# Patient Record
Sex: FEMALE | Race: WHITE | NOT HISPANIC OR LATINO | URBAN - METROPOLITAN AREA
[De-identification: names, ages, dates, MRNs, and addresses within clinical notes are randomized per-mention and may not be internally consistent; named-entity substitution may affect disease eponyms.]

---

## 2022-06-21 ENCOUNTER — OFFICE VISIT (OUTPATIENT)
Dept: OBSTETRICS AND GYNECOLOGY | Facility: CLINIC | Age: 32
End: 2022-06-21

## 2022-06-21 VITALS — SYSTOLIC BLOOD PRESSURE: 120 MMHG | WEIGHT: 205 LBS | DIASTOLIC BLOOD PRESSURE: 76 MMHG

## 2022-06-21 DIAGNOSIS — N92.1 MENORRHAGIA WITH IRREGULAR CYCLE: ICD-10-CM

## 2022-06-21 DIAGNOSIS — E28.2 PCOS (POLYCYSTIC OVARIAN SYNDROME): Primary | ICD-10-CM

## 2022-06-21 PROCEDURE — 99204 OFFICE O/P NEW MOD 45 MIN: CPT | Performed by: OBSTETRICS & GYNECOLOGY

## 2022-06-21 RX ORDER — DOXEPIN HYDROCHLORIDE 10 MG/1
1 CAPSULE ORAL NIGHTLY
COMMUNITY
Start: 2022-03-03

## 2022-06-21 RX ORDER — BUSPIRONE HYDROCHLORIDE 10 MG/1
1 TABLET ORAL 3 TIMES DAILY PRN
COMMUNITY
Start: 2022-04-14

## 2022-06-21 RX ORDER — FAMOTIDINE 40 MG/1
1 TABLET, FILM COATED ORAL EVERY EVENING
COMMUNITY
Start: 2022-06-10 | End: 2022-07-26

## 2022-06-21 RX ORDER — BUPROPION HYDROCHLORIDE 300 MG/1
1 TABLET ORAL EVERY MORNING
COMMUNITY
Start: 2022-03-03

## 2022-06-21 RX ORDER — SPIRONOLACTONE 25 MG/1
1 TABLET ORAL 2 TIMES DAILY
COMMUNITY
Start: 2022-01-21

## 2022-06-21 RX ORDER — PROGESTERONE 200 MG/1
200 CAPSULE ORAL NIGHTLY
Qty: 10 CAPSULE | Refills: 0 | Status: SHIPPED | OUTPATIENT
Start: 2022-06-21 | End: 2022-07-01

## 2022-06-21 RX ORDER — ALBUTEROL SULFATE 90 UG/1
2 AEROSOL, METERED RESPIRATORY (INHALATION) EVERY 4 HOURS PRN
COMMUNITY
Start: 2022-04-14

## 2022-06-21 RX ORDER — METFORMIN HYDROCHLORIDE 500 MG/1
1000 TABLET, EXTENDED RELEASE ORAL
COMMUNITY
Start: 2022-01-21 | End: 2023-02-27

## 2022-06-23 ENCOUNTER — PATIENT ROUNDING (BHMG ONLY) (OUTPATIENT)
Dept: OBSTETRICS AND GYNECOLOGY | Facility: CLINIC | Age: 32
End: 2022-06-23

## 2022-06-23 ENCOUNTER — TELEPHONE (OUTPATIENT)
Dept: OBSTETRICS AND GYNECOLOGY | Facility: CLINIC | Age: 32
End: 2022-06-23

## 2022-06-23 NOTE — TELEPHONE ENCOUNTER
Some labs still pending, and she has a f/u US in 2 weeks in our office. She aid her dad had a stroke and would not be able to tell her about a family hx of elevated triglycerides. She also said he had a blood disorder that caused the stroke. I rent the email to sing up for Nan. Her PCP is not a [facility] provider.

## 2022-06-23 NOTE — TELEPHONE ENCOUNTER
PT RETURNING CALL ABOUT LAB RESULTS  STATED SHE IS AVAILABLE FOR A CALL BACK BETWEEN 12-1 AND AFTER 5

## 2022-06-23 NOTE — PROGRESS NOTES
June 23, 2022    Hello, may I speak with Liat Mendez?    My name is Candace     I am  with [facility]  [address]  [place] [address] [phone].    Before we get started may I verify your date of birth? 1990    I am calling to officially welcome you to our practice and ask about your recent visit. Is this a good time to talk? Yes     Tell me about your visit with us. What things went well?  Everything went good.      We're always looking for ways to make our patients' experiences even better. Do you have recommendations on ways we may improve?  No     Overall were you satisfied with your first visit to our practice? Yes      I appreciate you taking the time to speak with me today. Is there anything else I can do for you? No     Thank you, and have a great day.

## 2022-06-24 LAB
17OHP SERPL-MCNC: 55 NG/DL
25(OH)D3+25(OH)D2 SERPL-MCNC: 42.3 NG/ML (ref 30–100)
ALBUMIN SERPL-MCNC: 4.6 G/DL (ref 3.8–4.8)
ALBUMIN/GLOB SERPL: 1.8 {RATIO} (ref 1.2–2.2)
ALP SERPL-CCNC: 78 IU/L (ref 44–121)
ALT SERPL-CCNC: 21 IU/L (ref 0–32)
APTT SCREEN TO CONFIRM RATIO: 0.89 RATIO (ref 0–1.34)
AST SERPL-CCNC: 15 IU/L (ref 0–40)
BILIRUB SERPL-MCNC: 0.3 MG/DL (ref 0–1.2)
BUN SERPL-MCNC: 10 MG/DL (ref 6–20)
BUN/CREAT SERPL: 10 (ref 9–23)
CALCIUM SERPL-MCNC: 10.2 MG/DL (ref 8.7–10.2)
CARDIOLIPIN IGA SER IA-ACNC: <9 APL U/ML (ref 0–11)
CARDIOLIPIN IGG SER IA-ACNC: <9 GPL U/ML (ref 0–14)
CARDIOLIPIN IGM SER IA-ACNC: 9 MPL U/ML (ref 0–12)
CHLORIDE SERPL-SCNC: 102 MMOL/L (ref 96–106)
CHOLEST SERPL-MCNC: 243 MG/DL (ref 100–199)
CO2 SERPL-SCNC: 24 MMOL/L (ref 20–29)
CONFIRM APTT/NORMAL: 29.8 SEC (ref 0–47.6)
CREAT SERPL-MCNC: 0.97 MG/DL (ref 0.57–1)
DHEA-S SERPL-MCNC: 88 UG/DL (ref 84.8–378)
EGFRCR SERPLBLD CKD-EPI 2021: 80 ML/MIN/1.73
ERYTHROCYTE [DISTWIDTH] IN BLOOD BY AUTOMATED COUNT: 12.3 % (ref 11.7–15.4)
FSH SERPL-ACNC: 6 MIU/ML
GLOBULIN SER CALC-MCNC: 2.6 G/DL (ref 1.5–4.5)
GLUCOSE SERPL-MCNC: 71 MG/DL (ref 65–99)
HBA1C MFR BLD: 5.3 % (ref 4.8–5.6)
HCT VFR BLD AUTO: 44 % (ref 34–46.6)
HDLC SERPL-MCNC: 31 MG/DL
HGB BLD-MCNC: 15.2 G/DL (ref 11.1–15.9)
LA 2 SCREEN W REFLEX-IMP: NORMAL
LDLC SERPL CALC-MCNC: 103 MG/DL (ref 0–99)
LH SERPL-ACNC: 14.5 MIU/ML
MCH RBC QN AUTO: 30.6 PG (ref 26.6–33)
MCHC RBC AUTO-ENTMCNC: 34.5 G/DL (ref 31.5–35.7)
MCV RBC AUTO: 89 FL (ref 79–97)
PLATELET # BLD AUTO: 323 X10E3/UL (ref 150–450)
POTASSIUM SERPL-SCNC: 4.9 MMOL/L (ref 3.5–5.2)
PROGEST SERPL-MCNC: 0.2 NG/ML
PROLACTIN SERPL-MCNC: 7.8 NG/ML (ref 4.8–23.3)
PROT SERPL-MCNC: 7.2 G/DL (ref 6–8.5)
RBC # BLD AUTO: 4.97 X10E6/UL (ref 3.77–5.28)
SCREEN APTT: 32.4 SEC (ref 0–51.9)
SCREEN DRVVT: 29 SEC (ref 0–47)
SODIUM SERPL-SCNC: 140 MMOL/L (ref 134–144)
TESTOST FREE SERPL-MCNC: 1.2 PG/ML (ref 0–4.2)
THROMBIN TIME: 16.8 SEC (ref 0–23)
TRIGL SERPL-MCNC: 634 MG/DL (ref 0–149)
TSH SERPL DL<=0.005 MIU/L-ACNC: 2.18 UIU/ML (ref 0.45–4.5)
VLDLC SERPL CALC-MCNC: 109 MG/DL (ref 5–40)
WBC # BLD AUTO: 9.8 X10E3/UL (ref 3.4–10.8)

## 2022-07-05 ENCOUNTER — OFFICE VISIT (OUTPATIENT)
Dept: OBSTETRICS AND GYNECOLOGY | Facility: CLINIC | Age: 32
End: 2022-07-05

## 2022-07-05 VITALS
BODY MASS INDEX: 34.16 KG/M2 | SYSTOLIC BLOOD PRESSURE: 118 MMHG | WEIGHT: 205 LBS | HEIGHT: 65 IN | DIASTOLIC BLOOD PRESSURE: 68 MMHG

## 2022-07-05 DIAGNOSIS — N92.1 MENORRHAGIA WITH IRREGULAR CYCLE: ICD-10-CM

## 2022-07-05 PROCEDURE — 99213 OFFICE O/P EST LOW 20 MIN: CPT | Performed by: OBSTETRICS & GYNECOLOGY

## 2022-07-05 PROCEDURE — 76830 TRANSVAGINAL US NON-OB: CPT | Performed by: OBSTETRICS & GYNECOLOGY

## 2022-07-05 NOTE — PROGRESS NOTES
Chief Complaint   Patient presents with   • Menstrual Problem         Subjective   HPI  Liat Mendez is a 32 y.o. female, , who presents with recurrent evaluation of Abnormal Uterine Bleeding.      She states she has experienced this problem for 4 duration: months.  She describes the severity as moderate and severe.  She states that the problem is worsening.  The patient uses 1 of tampons/pads per hour.  The patient reports additional symptoms as spotting.  The patient has  been evaluated:  No.  In the past the patient has tried denies.    Her last LMP was 2022.  Periods are spotting and bleeding 3 weeks of the month, lasting several days.  Partner Status: Marital Status: .     Labs reviewed and results discussed.   Clinical appearance consistent with PCOS. Discussed treatment options.   US- thickened endometrium with PCO appearing ovaries. Recommend hysteroscopy, D&C.     Additional OB/GYN History   Last Pap : 2021  Last Completed Pap Smear          PAP SMEAR (Every 3 Years) Next due on 2021  HM Pap smear component of Liquid-based Pap Smear, Screening              History of abnormal Pap smear: no  Tobacco Usage?: Yes Liat Mendez  reports that she has been smoking. She has never used smokeless tobacco.. I have educated her on the risk of diseases from using tobacco products such as cancer, COPD and heart disease.     I advised her to quit and she is willing to quit. We have discussed the following method/s for tobacco cessation:  Education Material.  Together we have set a quit date for 1 month from today.  She will follow up with me in 1 month or sooner to check on her progress.    I spent 3  minutes counseling the patient.          The additional following portions of the patient's history were reviewed and updated as appropriate: allergies, current medications, past family history, past medical history, past social history, past surgical history and problem  "list.    Review of Systems   Constitutional: Negative.    Respiratory: Negative.    Cardiovascular: Negative.    Gastrointestinal: Negative.    Genitourinary: Positive for menstrual problem. Negative for breast discharge, breast lump, breast pain and pelvic pain.   Musculoskeletal: Negative.    Neurological: Negative.    Psychiatric/Behavioral: Negative.        I have reviewed and agree with the HPI, ROS, and historical information as entered above. Justyn Thomason MD    Objective   /68   Ht 165.1 cm (65\")   Wt 93 kg (205 lb)   BMI 34.11 kg/m²     Physical Exam  Vitals reviewed.   Constitutional:       Appearance: Normal appearance. She is normal weight.   HENT:      Head: Normocephalic and atraumatic.   Abdominal:      General: Abdomen is flat. Bowel sounds are normal.      Palpations: Abdomen is soft.   Skin:     General: Skin is warm and dry.   Neurological:      Mental Status: She is alert and oriented to person, place, and time.   Psychiatric:         Mood and Affect: Mood normal.         Behavior: Behavior normal.         Assessment & Plan     Assessment     Problem List Items Addressed This Visit        Genitourinary and Reproductive     Menorrhagia with irregular cycle          1. Menorrhagia  2. PCOS    Plan     1. Discussed labs and US. Recommend proceeding with hysteroscopy, D&C due to thickened endometrium. Will look at dates and decide when.       Justyn Thomason MD  07/05/2022  "

## 2022-07-12 ENCOUNTER — OFFICE VISIT (OUTPATIENT)
Dept: OBSTETRICS AND GYNECOLOGY | Facility: CLINIC | Age: 32
End: 2022-07-12

## 2022-07-12 VITALS
HEIGHT: 65 IN | BODY MASS INDEX: 33.66 KG/M2 | SYSTOLIC BLOOD PRESSURE: 104 MMHG | WEIGHT: 202 LBS | DIASTOLIC BLOOD PRESSURE: 80 MMHG

## 2022-07-12 DIAGNOSIS — N93.9 ABNORMAL UTERINE BLEEDING (AUB): ICD-10-CM

## 2022-07-12 DIAGNOSIS — Z01.818 PREOP EXAMINATION: Primary | ICD-10-CM

## 2022-07-12 DIAGNOSIS — E28.2 PCOS (POLYCYSTIC OVARIAN SYNDROME): ICD-10-CM

## 2022-07-12 PROCEDURE — 99213 OFFICE O/P EST LOW 20 MIN: CPT | Performed by: OBSTETRICS & GYNECOLOGY

## 2022-07-12 NOTE — PROGRESS NOTES
Pre-Op Visit    Chief Complaint   Patient presents with   • Pre-op Exam   AUB    HPI  Liat Mendez is 32 y.o.  scheduled to have Total Laparoscopic Hysterectomy at [facility] on 2022 at 630.  Her pre operative diagnosis is     ICD-10-CM ICD-9-CM   1. Preop examination  Z01.818 V72.84   2. PCOS (polycystic ovarian syndrome)  E28.2 256.4   3. Abnormal uterine bleeding (AUB)  N93.9 626.9   . Her last menstrual period was No LMP recorded..  Her birth control method is no method. Her BMI is Body mass index is 33.61 kg/m²..    She has review the informational video on hysteroscopy.    She has review the [facility] Pamphlet on hysteroscopy.    She understand the risks of bleeding, infections, possible damage to other organ systems, including but not limited to the gastrointestinal tract and genitourinary tract. She also understands the specific risks listed in the pre op information (video ,pamphlets, etc.)    She has review and signed the pre op consent form.    She has been instructed to have a light dinner the night before surgery, then nothing to eat or drink after midnight.  She is on the following medications:  Outpatient Encounter Medications as of 2022   Medication Sig Dispense Refill   • albuterol sulfate  (90 Base) MCG/ACT inhaler Inhale 2 puffs Every 4 (Four) Hours As Needed.     • buPROPion XL (WELLBUTRIN XL) 300 MG 24 hr tablet Take 1 tablet by mouth Every Morning.     • busPIRone (BUSPAR) 10 MG tablet Take 1 tablet by mouth 3 (Three) Times a Day As Needed.     • doxepin (SINEquan) 10 MG capsule Take 1 capsule by mouth Every Night.     • metFORMIN ER (GLUCOPHAGE-XR) 500 MG 24 hr tablet Take 1,000 mg by mouth.     • spironolactone (ALDACTONE) 25 MG tablet Take 1 tablet by mouth 2 (Two) Times a Day.     • famotidine (PEPCID) 40 MG tablet Take 1 tablet by mouth Every Evening.       No facility-administered encounter medications on file as of 2022.         The day of surgery,  do not chew gum or smoke. Remove all jewelry, nail polish and contact lens prior to coming to the hospital. Do not bring large sums of money or valuables.  Arrive at the hospital 1.5hrs prior to procedure.  You will talk with the anesthesiologist that morning.  An IV will be started to provide fluids and sedation.  The procedure will take approximately 30 min hour(s).  You will need to have someone drive you home after the surgery.    She has confirmed that she is not allergic to latex.      Review of Systems   Constitutional: Negative.    Respiratory: Negative.    Cardiovascular: Negative.    Gastrointestinal: Negative.    Genitourinary: Positive for menstrual problem. Negative for pelvic pain.   Musculoskeletal: Negative.    Neurological: Negative.    Psychiatric/Behavioral: Negative.           Physical Exam  Vitals reviewed.   Constitutional:       Appearance: Normal appearance. She is normal weight.   HENT:      Head: Normocephalic and atraumatic.   Cardiovascular:      Rate and Rhythm: Normal rate and regular rhythm.   Pulmonary:      Effort: Pulmonary effort is normal.      Breath sounds: Normal breath sounds.   Abdominal:      General: Abdomen is flat. Bowel sounds are normal.      Palpations: Abdomen is soft.   Skin:     General: Skin is warm and dry.   Neurological:      Mental Status: She is alert and oriented to person, place, and time.   Psychiatric:         Mood and Affect: Mood normal.         Behavior: Behavior normal.            Assessment:    ICD-10-CM ICD-9-CM   1. Preop examination  Z01.818 V72.84   2. PCOS (polycystic ovarian syndrome)  E28.2 256.4   3. Abnormal uterine bleeding (AUB)  N93.9 626.9       Plan:  Orders Placed This Encounter   Procedures   • CBC (No Diff)     Order Specific Question:   Release to patient     Answer:   Immediate   • HCG, B-subunit, Quantitative     Order Specific Question:   Release to patient     Answer:   Immediate       Instructions:  1. Pre op labs done in our  office  2. Review with the patient the risk of bleeding, infections, possible damage to other organ systems, including but not limited to the gastrointestinal tract and genitourinary tract.  Reviewed the risk of anesthesia as well as the risk the surgery will not produce the desired results.  Operative permit signed and scanned into the chart for diagnostic hysteroscopy, Dilation and curettage, endometrial polypectomy with Myosure.  3. Reviewed the risk of perforation of the uterus.  4. JAYMIE report has been reviewed.  5. Pain Medication Consent Form has been signed.  A review regarding proper medication administration; impact on driving and working while medicated; the safety of use in pregnancy; the potential for overdose; and the proper disposal and stroage of controlled medications has been done with the patient.  6. Electronically Identified Patient Education Materials provided electronically      Justyn Thomason MD   07/12/22

## 2022-07-13 LAB
ERYTHROCYTE [DISTWIDTH] IN BLOOD BY AUTOMATED COUNT: 12.4 % (ref 11.7–15.4)
HCG INTACT+B SERPL-ACNC: <1 MIU/ML
HCT VFR BLD AUTO: 40.2 % (ref 34–46.6)
HGB BLD-MCNC: 13.5 G/DL (ref 11.1–15.9)
MCH RBC QN AUTO: 30 PG (ref 26.6–33)
MCHC RBC AUTO-ENTMCNC: 33.6 G/DL (ref 31.5–35.7)
MCV RBC AUTO: 89 FL (ref 79–97)
PLATELET # BLD AUTO: 274 X10E3/UL (ref 150–450)
RBC # BLD AUTO: 4.5 X10E6/UL (ref 3.77–5.28)
WBC # BLD AUTO: 9.6 X10E3/UL (ref 3.4–10.8)

## 2022-07-14 ENCOUNTER — LAB REQUISITION (OUTPATIENT)
Dept: LAB | Facility: HOSPITAL | Age: 32
End: 2022-07-14

## 2022-07-14 ENCOUNTER — OUTSIDE FACILITY SERVICE (OUTPATIENT)
Dept: OBSTETRICS AND GYNECOLOGY | Facility: CLINIC | Age: 32
End: 2022-07-14

## 2022-07-14 DIAGNOSIS — N92.1 EXCESSIVE AND FREQUENT MENSTRUATION WITH IRREGULAR CYCLE: ICD-10-CM

## 2022-07-14 DIAGNOSIS — Z98.890 STATUS POST HYSTEROSCOPY: Primary | ICD-10-CM

## 2022-07-14 PROCEDURE — 58563 HYSTEROSCOPY ABLATION: CPT | Performed by: OBSTETRICS & GYNECOLOGY

## 2022-07-14 PROCEDURE — 88305 TISSUE EXAM BY PATHOLOGIST: CPT | Performed by: OBSTETRICS & GYNECOLOGY

## 2022-07-14 RX ORDER — HYDROCODONE BITARTRATE AND ACETAMINOPHEN 5; 325 MG/1; MG/1
1 TABLET ORAL EVERY 6 HOURS PRN
Qty: 8 TABLET | Refills: 0 | Status: SHIPPED | OUTPATIENT
Start: 2022-07-14 | End: 2022-08-25

## 2022-07-14 RX ORDER — IBUPROFEN 800 MG/1
800 TABLET ORAL EVERY 8 HOURS PRN
Qty: 30 TABLET | Refills: 1 | Status: SHIPPED | OUTPATIENT
Start: 2022-07-14 | End: 2023-03-09 | Stop reason: SDUPTHER

## 2022-07-14 RX ORDER — PROMETHAZINE HYDROCHLORIDE 12.5 MG/1
12.5 TABLET ORAL EVERY 6 HOURS PRN
Qty: 20 TABLET | Refills: 1 | Status: SHIPPED | OUTPATIENT
Start: 2022-07-14

## 2022-07-15 LAB
CYTO UR: NORMAL
LAB AP CASE REPORT: NORMAL
LAB AP CLINICAL INFORMATION: NORMAL
PATH REPORT.FINAL DX SPEC: NORMAL
PATH REPORT.GROSS SPEC: NORMAL

## 2022-07-26 ENCOUNTER — OFFICE VISIT (OUTPATIENT)
Dept: OBSTETRICS AND GYNECOLOGY | Facility: CLINIC | Age: 32
End: 2022-07-26

## 2022-07-26 VITALS
BODY MASS INDEX: 34.16 KG/M2 | HEIGHT: 65 IN | SYSTOLIC BLOOD PRESSURE: 115 MMHG | WEIGHT: 205 LBS | DIASTOLIC BLOOD PRESSURE: 70 MMHG

## 2022-07-26 DIAGNOSIS — N85.01 COMPLEX ENDOMETRIAL HYPERPLASIA WITHOUT ATYPIA: Primary | ICD-10-CM

## 2022-07-26 PROCEDURE — 99213 OFFICE O/P EST LOW 20 MIN: CPT | Performed by: OBSTETRICS & GYNECOLOGY

## 2022-07-26 RX ORDER — MEDROXYPROGESTERONE ACETATE 10 MG/1
10 TABLET ORAL DAILY
Qty: 30 TABLET | Refills: 3 | Status: SHIPPED | OUTPATIENT
Start: 2022-07-26 | End: 2022-08-25

## 2022-07-26 NOTE — PROGRESS NOTES
OBGYN Postoperative Exam Note          Subjective   Chief Complaint   Patient presents with   • Post-op     Liat Mendez is a 32 y.o. year old  presenting to be seen for her post-operative visit. She is S/P hysteroscopy with Myosure and D&C on 2022 at [facility] for AUB and PCOS. Currently she reports no problems with eating, bowel movements, voiding, or wound drainage and pain is well controlled.    The pathology results from her procedure are in Liat's record and are complex hyperplasia without atypia.   The results were discussed with Liat.    OTHER THINGS SHE WANTS TO DISCUSS TODAY:  Nothing else      Current Outpatient Medications:   •  albuterol sulfate  (90 Base) MCG/ACT inhaler, Inhale 2 puffs Every 4 (Four) Hours As Needed., Disp: , Rfl:   •  buPROPion XL (WELLBUTRIN XL) 300 MG 24 hr tablet, Take 1 tablet by mouth Every Morning., Disp: , Rfl:   •  busPIRone (BUSPAR) 10 MG tablet, Take 1 tablet by mouth 3 (Three) Times a Day As Needed., Disp: , Rfl:   •  doxepin (SINEquan) 10 MG capsule, Take 1 capsule by mouth Every Night., Disp: , Rfl:   •  HYDROcodone-acetaminophen (Norco) 5-325 MG per tablet, Take 1 tablet by mouth Every 6 (Six) Hours As Needed for Moderate Pain ., Disp: 8 tablet, Rfl: 0  •  ibuprofen (ADVIL,MOTRIN) 800 MG tablet, Take 1 tablet by mouth Every 8 (Eight) Hours As Needed for Mild Pain ., Disp: 30 tablet, Rfl: 1  •  metFORMIN ER (GLUCOPHAGE-XR) 500 MG 24 hr tablet, Take 1,000 mg by mouth., Disp: , Rfl:   •  promethazine (PHENERGAN) 12.5 MG tablet, Take 1 tablet by mouth Every 6 (Six) Hours As Needed for Nausea., Disp: 20 tablet, Rfl: 1  •  spironolactone (ALDACTONE) 25 MG tablet, Take 1 tablet by mouth 2 (Two) Times a Day., Disp: , Rfl:   •  medroxyPROGESTERone (Provera) 10 MG tablet, Take 1 tablet by mouth Daily for 30 days., Disp: 30 tablet, Rfl: 3     Past Medical History:   Diagnosis Date   • Abnormal Pap smear of cervix    • Chlamydia    • Depression    •  "Migraine    • Multiple gestation    • Ovarian cyst    • Polycystic ovary syndrome    • Trauma    • Varicella         Past Surgical History:   Procedure Laterality Date   • INNER EAR SURGERY         The following portions of the patient's history were reviewed and updated as appropriate:problem list, current medications, allergies, past family history, past medical history, past social history and past surgical history    Review of Systems   Constitutional: Negative.    HENT: Negative.    Eyes: Negative.    Respiratory: Negative.    Cardiovascular: Negative.    Gastrointestinal: Negative.    Endocrine: Negative.    Genitourinary: Negative.    Musculoskeletal: Negative.    Skin: Negative.    Allergic/Immunologic: Negative.    Neurological: Negative.    Hematological: Negative.    Psychiatric/Behavioral: Negative.           Objective   /70   Ht 165.1 cm (65\")   Wt 93 kg (205 lb)   LMP 07/06/2022 (LMP Unknown)   BMI 34.11 kg/m²     Physical Exam  Vitals reviewed.   Constitutional:       Appearance: Normal appearance.   HENT:      Head: Normocephalic and atraumatic.   Skin:     General: Skin is warm and dry.   Neurological:      Mental Status: She is alert and oriented to person, place, and time.   Psychiatric:         Mood and Affect: Mood normal.         Behavior: Behavior normal.              Assessment   1. S/P hysteroscopy with Myosure     Plan   1. May return to full activity with no restrictions  2. The importance of keeping all planned follow-up and taking all medications as prescribed was emphasized.  3. Return if symptoms worsen or fail to improve, for Referral made to Dr. Amador due to complex endometrial hyperplasia.   4. Rx for provera sent in to pharmacy today.              Justyn Thomason MD  07/26/2022    "

## 2022-08-19 ENCOUNTER — TELEPHONE (OUTPATIENT)
Dept: OBSTETRICS AND GYNECOLOGY | Facility: CLINIC | Age: 32
End: 2022-08-19

## 2022-08-19 NOTE — TELEPHONE ENCOUNTER
"Pt called in stating that she has been bleeding for about 3 weeks and that she has been taking the provera everyday that Dr Thomason prescribed. Pt states that she is having \"clots the size of tampons\". Pt asking if there is anything else that can be sent to her pharmacy, [place], [place]. Pt states that she has an appt on 8/25/22 with Laura Amador. Please advise.  "

## 2022-08-22 NOTE — TELEPHONE ENCOUNTER
Spoke with pt, notified her of Dr. Thomason's orders, she states she will F/U with Dr. Amador and let us know if she needs anything else.

## 2022-08-24 NOTE — PROGRESS NOTES
New Patient Office Visit      Patient Name: Kristen Mendez  : 1990   MRN: [id number]     Referring Physician: Justyn Thomason, *    Chief Complaint:  Complex hyperplasia of the uterus without atypia    History of Present Illness: Kristen Mendez is a 32 y.o. female who is here today as a consultation with gynecologic oncology for endometrial hyperplasia. She has a 4 to 5-month history of heavy menses requiring use of 1 pad or tampon per hour at the beginning of her periods.  As part of this work-up she underwent a transvaginal ultrasound that showed a thickened endometrial stripe.  She subsequently had a hysteroscopy and D&C which demonstrated the after mentioned diagnosis.  She presents today to discuss options.  Of note she has a history of 2 prior stillbirth due to placental abruption is interested in fertility.  Additionally she was started on Provera by Dr. Tello but continues to have heavy vaginal bleeding.    Past Medical History:   Past Medical History:   Diagnosis Date   • Abnormal Pap smear of cervix    • Anxiety    • Chlamydia    • Depression    • Migraine    • Multiple gestation    • Ovarian cyst    • Polycystic ovary syndrome    • Trauma    • Varicella        Past Surgical History:   Past Surgical History:   Procedure Laterality Date   • INNER EAR SURGERY     • WISDOM TOOTH EXTRACTION         Family History:   Family History   Problem Relation Age of Onset   • Breast cancer Maternal Aunt    • Stroke Father    • Diabetes Paternal Grandmother    • Ovarian cancer Neg Hx    • Uterine cancer Neg Hx    • Colon cancer Neg Hx        Social History:   Social History     Socioeconomic History   • Marital status:    Tobacco Use   • Smoking status: Former Smoker     Packs/day: 0.00     Years: 15.00     Pack years: 0.00     Types: Cigarettes     Start date: 2017     Quit date: 2022     Years since quittin.1   • Smokeless tobacco: Never Used   Vaping Use   • Vaping Use:  Never used   Substance and Sexual Activity   • Alcohol use: Yes     Alcohol/week: 1.0 standard drink     Types: 1 Cans of beer per week     Comment: social   • Drug use: Never   • Sexual activity: Yes     Partners: Male     Birth control/protection: None       Past OB/GYN History:   OB History    Para Term  AB Living   2 0           SAB IAB Ectopic Molar Multiple Live Births                    # Outcome Date GA Lbr Moisés/2nd Weight Sex Delivery Anes PTL Lv   2             1             She has a history of abnormal paps. She has not required any procedures on her cervix.  Has a history of 2 prior placental abruptions with stillbirth.  Subsequently found to have APLS    Medications:     Current Outpatient Medications:   •  albuterol sulfate  (90 Base) MCG/ACT inhaler, Inhale 2 puffs Every 4 (Four) Hours As Needed., Disp: , Rfl:   •  buPROPion XL (WELLBUTRIN XL) 300 MG 24 hr tablet, Take 1 tablet by mouth Every Morning., Disp: , Rfl:   •  busPIRone (BUSPAR) 10 MG tablet, Take 1 tablet by mouth 3 (Three) Times a Day As Needed., Disp: , Rfl:   •  doxepin (SINEquan) 10 MG capsule, Take 1 capsule by mouth Every Night., Disp: , Rfl:   •  ibuprofen (ADVIL,MOTRIN) 800 MG tablet, Take 1 tablet by mouth Every 8 (Eight) Hours As Needed for Mild Pain ., Disp: 30 tablet, Rfl: 1  •  metFORMIN ER (GLUCOPHAGE-XR) 500 MG 24 hr tablet, Take 1,000 mg by mouth., Disp: , Rfl:   •  promethazine (PHENERGAN) 12.5 MG tablet, Take 1 tablet by mouth Every 6 (Six) Hours As Needed for Nausea., Disp: 20 tablet, Rfl: 1  •  spironolactone (ALDACTONE) 25 MG tablet, Take 1 tablet by mouth 2 (Two) Times a Day., Disp: , Rfl:   •  megestrol (MEGACE) 40 MG tablet, Take 1 tablet by mouth Daily., Disp: 60 tablet, Rfl: 2    Allergies:   No Known Allergies    Review of Systems:   As per HPI    Objective     Physical Exam:  Vital Signs:   Vitals:    22 1511   BP: 135/63   Pulse: 93   Resp: 18   Temp: 97.1 °F (36.2 °C)  "  SpO2: 99%   Weight: 92.8 kg (204 lb 9.6 oz)   Height: 162.6 cm (64\")   PainSc:   4   PainLoc: Groin     BMI: Body mass index is 35.12 kg/m².   ECOG score: 0           PHQ-2 Depression Screening  Little interest or pleasure in doing things?     Feeling down, depressed, or hopeless?     PHQ-2 Total Score       Physical Examination:   General appearance - alert, well appearing, and in no distress and oriented to person, place, and time  Mental status - normal mood, behavior, speech, dress, motor activity, and thought processes  Eyes - sclera anicteric, left eye normal, right eye normal  Ears - right ear normal, left ear normal  Nose - mask  Mouth - mask  Lymphatics - no palpable lymphadenopathy, no hepatosplenomegaly  Heart - normal rate, regular rhythm, normal S1, S2, no murmurs, rubs, clicks or gallops  Abdomen - soft, nontender, nondistended, no masses or organomegaly  Pelvic - deferred  Neurological - alert, oriented, normal speech, no focal findings or movement disorder noted  Musculoskeletal - no joint tenderness, deformity or swelling  Extremities - peripheral pulses normal, no pedal edema, no clubbing or cyanosis  Skin - normal coloration and turgor, no rashes, no suspicious skin lesions noted     Imaging: Images reviewed directly by me and I agree with the impression.  Thickened endometrial stripe consistent with the hyperplasia seen on pathology.  7/5/2022: TVUS with uterus measuring 10.4 x 4.9 x 6.0 cm with an endometrial stripe of 21.2 mm.  Right and left ovaries with polycystic appearance.  No free fluid.    Pathology:  7/14/2022: ENDOMETRIUM, CURETTAGE:               Simple and focally complex hyperplasia without atypia    Assessment / Plan    Kristen Mendez is a 32 y.o. year old female recently diagnosed with simple and complex hyperplasia without atypia.  We discussed that the 10-year risk of developing endometrial cancer is approximately 3%.  As the patient desires future fertility I would recommend " proceeding with progesterone therapy which would hopefully result in regression of her hyperplasia.  We discussed use of progestin therapy administered via a levonorgestrel intrauterine device (IUD) or oral progestin therapy. We discussed risks and benefits of medical management including the failure of response that may result in progression to malignancy though with her pathologic findings I think her risk of this is quite low.  She is also aware of the side effects/complications of therapy including weight gain, development and/or worsening of diabetes, breast tenderness/symptoms, and IUD related complications such as perforation, expulsion, etc. after discussion the patient would like to try a more potent progesterone so prescription for Megace 40 mg sent to the patient's pharmacy.  If she desires an IUD at any point she is to call let me know and we can have this placed.  She is also aware that she will require repeat endometrial evaluation to ensure that the hormonal therapy is effective and this would be repeated at 3 to 6 month intervals. If the endometrium fails to respond, the need for definitive surgical management will be revisited. After all questions were answered, she has opted to proceed with oral progesterone and we have made the necessary arrangements.  She will follow-up with me in 6 months for evaluation and endometrial biopsy.    Pain assessment was performed today as a part of patient’s care.  For patients with pain related to surgery, gynecologic malignancy or cancer treatment, the plan is as noted in the assessment/plan.  For patients with pain not related to these issues, they are to seek any further needed care from a more appropriate provider, such as PCP.      Diagnoses and all orders for this visit:    1. Endometrial hyperplasia without atypia (Primary)    2. Abnormal uterine bleeding (AUB)    3. Menorrhagia with irregular cycle    4. PCOS (polycystic ovarian syndrome)    Other orders  -      megestrol (MEGACE) 40 MG tablet; Take 1 tablet by mouth Daily.  Dispense: 60 tablet; Refill: 2         Follow Up: 6 months    DARIO Amador MD  Gynecologic Oncology

## 2022-08-25 ENCOUNTER — OFFICE VISIT (OUTPATIENT)
Dept: GYNECOLOGIC ONCOLOGY | Facility: CLINIC | Age: 32
End: 2022-08-25

## 2022-08-25 VITALS
OXYGEN SATURATION: 99 % | BODY MASS INDEX: 34.93 KG/M2 | SYSTOLIC BLOOD PRESSURE: 135 MMHG | HEART RATE: 93 BPM | TEMPERATURE: 97.1 F | WEIGHT: 204.6 LBS | DIASTOLIC BLOOD PRESSURE: 63 MMHG | RESPIRATION RATE: 18 BRPM | HEIGHT: 64 IN

## 2022-08-25 DIAGNOSIS — N93.9 ABNORMAL UTERINE BLEEDING (AUB): ICD-10-CM

## 2022-08-25 DIAGNOSIS — E28.2 PCOS (POLYCYSTIC OVARIAN SYNDROME): ICD-10-CM

## 2022-08-25 DIAGNOSIS — N85.00 ENDOMETRIAL HYPERPLASIA WITHOUT ATYPIA: Primary | ICD-10-CM

## 2022-08-25 DIAGNOSIS — N92.1 MENORRHAGIA WITH IRREGULAR CYCLE: ICD-10-CM

## 2022-08-25 PROCEDURE — 99204 OFFICE O/P NEW MOD 45 MIN: CPT | Performed by: OBSTETRICS & GYNECOLOGY

## 2022-08-25 RX ORDER — MEGESTROL ACETATE 40 MG/1
40 TABLET ORAL DAILY
Qty: 60 TABLET | Refills: 2 | Status: SHIPPED | OUTPATIENT
Start: 2022-08-25 | End: 2022-12-20

## 2022-08-26 ENCOUNTER — PATIENT ROUNDING (BHMG ONLY) (OUTPATIENT)
Dept: GYNECOLOGIC ONCOLOGY | Facility: CLINIC | Age: 32
End: 2022-08-26

## 2022-08-26 NOTE — PROGRESS NOTES
A My-Chart message has been sent to the patient for PATIENT ROUNDING with [facility].

## 2022-12-20 RX ORDER — MEGESTROL ACETATE 40 MG/1
TABLET ORAL
Qty: 90 TABLET | Refills: 1 | Status: SHIPPED | OUTPATIENT
Start: 2022-12-20 | End: 2023-03-20 | Stop reason: SDUPTHER

## 2023-02-27 ENCOUNTER — OFFICE VISIT (OUTPATIENT)
Dept: GYNECOLOGIC ONCOLOGY | Facility: CLINIC | Age: 33
End: 2023-02-27
Payer: COMMERCIAL

## 2023-02-27 VITALS
HEART RATE: 111 BPM | HEIGHT: 64 IN | SYSTOLIC BLOOD PRESSURE: 104 MMHG | BODY MASS INDEX: 34.45 KG/M2 | TEMPERATURE: 97.3 F | DIASTOLIC BLOOD PRESSURE: 69 MMHG | RESPIRATION RATE: 18 BRPM | OXYGEN SATURATION: 98 % | WEIGHT: 201.8 LBS

## 2023-02-27 DIAGNOSIS — N93.9 ABNORMAL UTERINE BLEEDING: Primary | ICD-10-CM

## 2023-02-27 DIAGNOSIS — N92.1 MENORRHAGIA WITH IRREGULAR CYCLE: ICD-10-CM

## 2023-02-27 PROCEDURE — 58100 BIOPSY OF UTERUS LINING: CPT | Performed by: OBSTETRICS & GYNECOLOGY

## 2023-02-27 PROCEDURE — 99213 OFFICE O/P EST LOW 20 MIN: CPT | Performed by: OBSTETRICS & GYNECOLOGY

## 2023-02-27 RX ORDER — OMEPRAZOLE 20 MG/1
1 CAPSULE, DELAYED RELEASE ORAL DAILY
COMMUNITY
Start: 2023-01-11

## 2023-02-27 RX ORDER — RIMEGEPANT SULFATE 75 MG/75MG
TABLET, ORALLY DISINTEGRATING ORAL
COMMUNITY
Start: 2023-01-30

## 2023-02-27 RX ORDER — VILAZODONE HYDROCHLORIDE 20 MG/1
1 TABLET ORAL DAILY
COMMUNITY
Start: 2023-01-17

## 2023-02-28 PROCEDURE — 88305 TISSUE EXAM BY PATHOLOGIST: CPT | Performed by: OBSTETRICS & GYNECOLOGY

## 2023-03-02 ENCOUNTER — TELEPHONE (OUTPATIENT)
Dept: GYNECOLOGIC ONCOLOGY | Facility: CLINIC | Age: 33
End: 2023-03-02
Payer: COMMERCIAL

## 2023-03-02 NOTE — TELEPHONE ENCOUNTER
----- Message from Laura Amador MD sent at 3/2/2023 12:44 PM EST -----  Please call to let Kristen BXO know that her pathology is benign. She does not need any follow up with me at this time but she can always call if any questions or concerns arise. She does need routine follow up with her OB/GYN.

## 2023-03-09 RX ORDER — IBUPROFEN 800 MG/1
800 TABLET ORAL EVERY 8 HOURS PRN
Qty: 30 TABLET | Refills: 1 | Status: SHIPPED | OUTPATIENT
Start: 2023-03-09

## 2023-03-16 ENCOUNTER — PATIENT MESSAGE (OUTPATIENT)
Dept: OBSTETRICS AND GYNECOLOGY | Facility: CLINIC | Age: 33
End: 2023-03-16
Payer: COMMERCIAL

## 2023-03-16 DIAGNOSIS — N92.1 MENORRHAGIA WITH IRREGULAR CYCLE: Primary | ICD-10-CM

## 2023-03-20 ENCOUNTER — OFFICE VISIT (OUTPATIENT)
Dept: OBSTETRICS AND GYNECOLOGY | Facility: CLINIC | Age: 33
End: 2023-03-20
Payer: COMMERCIAL

## 2023-03-20 VITALS
HEIGHT: 64 IN | WEIGHT: 203 LBS | BODY MASS INDEX: 34.66 KG/M2 | SYSTOLIC BLOOD PRESSURE: 122 MMHG | DIASTOLIC BLOOD PRESSURE: 72 MMHG

## 2023-03-20 DIAGNOSIS — N92.1 MENORRHAGIA WITH IRREGULAR CYCLE: Primary | ICD-10-CM

## 2023-03-20 PROCEDURE — 99213 OFFICE O/P EST LOW 20 MIN: CPT | Performed by: OBSTETRICS & GYNECOLOGY

## 2023-03-20 RX ORDER — DICYCLOMINE HYDROCHLORIDE 10 MG/1
20 CAPSULE ORAL 3 TIMES DAILY PRN
Qty: 30 CAPSULE | Refills: 3 | Status: SHIPPED | OUTPATIENT
Start: 2023-03-20 | End: 2024-03-19

## 2023-03-20 RX ORDER — MEGESTROL ACETATE 40 MG/1
40 TABLET ORAL DAILY
Qty: 60 TABLET | Refills: 5 | Status: SHIPPED | OUTPATIENT
Start: 2023-03-20 | End: 2023-05-19

## 2023-03-20 RX ORDER — METFORMIN HYDROCHLORIDE 500 MG/1
500 TABLET, EXTENDED RELEASE ORAL
Qty: 30 TABLET | Refills: 10 | Status: SHIPPED | OUTPATIENT
Start: 2023-03-20

## 2023-03-20 NOTE — PROGRESS NOTES
Chief Complaint   Patient presents with   • endometrial hyperplasia   Menorrhagia    Subjective   HPI  Kristen Nicolas is a 33 y.o. female, . Her last LMP was No LMP recorded.. 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  She was diagnosed last summer with hysteroscopy D&C. Since that time she has been seeing Dr. Amador with GYN/ONC and has been on megace treatment. Her last EMB was last month and no evidence of hyperplasia. Now she is bleeding heavily once again.     The patient reports additional symptoms as pelvic pain.   Been bleeding since 2023 with large clots.     US today with thickened endometrium and possible small fibroid/polyp. Discussed findings and recommend hysteroscopy, D&C, endometrial polypectomy with Myosure.       Additional OB/GYN History     Last Pap : 2021  Last Completed Pap Smear          PAP SMEAR (Every 3 Years) Next due on 2021  HM Pap smear component of Liquid-based Pap Smear, Screening                Last mammogram: na  Last Completed Mammogram     This patient has no relevant Health Maintenance data.          Tobacco Usage?: No   OB History        2    Para   0    Term                AB        Living           SAB        IAB        Ectopic        Molar        Multiple        Live Births                      Current Outpatient Medications:   •  albuterol sulfate  (90 Base) MCG/ACT inhaler, Inhale 2 puffs Every 4 (Four) Hours As Needed., Disp: , Rfl:   •  buPROPion XL (WELLBUTRIN XL) 300 MG 24 hr tablet, Take 1 tablet by mouth Every Morning., Disp: , Rfl:   •  busPIRone (BUSPAR) 10 MG tablet, Take 1 tablet by mouth 3 (Three) Times a Day As Needed., Disp: , Rfl:   •  doxepin (SINEquan) 10 MG capsule, Take 1 capsule by mouth Every Night., Disp: , Rfl:   •  ibuprofen (ADVIL,MOTRIN) 800 MG tablet, Take 1 tablet by mouth Every 8 (Eight) Hours As Needed for Mild Pain., Disp: 30  tablet, Rfl: 1  •  megestrol (MEGACE) 40 MG tablet, Take 1 tablet by mouth Daily for 60 days., Disp: 60 tablet, Rfl: 5  •  metFORMIN (GLUCOPHAGE) 500 MG tablet, Take 1 tablet by mouth Every 12 (Twelve) Hours., Disp: , Rfl:   •  Nurtec 75 MG dispersible tablet, TAKE 1 TABLET(S) ORAL AS NEEDED FOR HEADACHE, Disp: , Rfl:   •  omeprazole (priLOSEC) 20 MG capsule, Take 1 capsule by mouth Daily., Disp: , Rfl:   •  promethazine (PHENERGAN) 12.5 MG tablet, Take 1 tablet by mouth Every 6 (Six) Hours As Needed for Nausea., Disp: 20 tablet, Rfl: 1  •  spironolactone (ALDACTONE) 25 MG tablet, Take 1 tablet by mouth 2 (Two) Times a Day., Disp: , Rfl:   •  vilazodone (VIIBRYD) 20 MG tablet tablet, Take 1 tablet by mouth Daily., Disp: , Rfl:   •  dicyclomine (BENTYL) 10 MG capsule, Take 2 capsules by mouth 3 (Three) Times a Day As Needed (cramping)., Disp: 30 capsule, Rfl: 3  •  metFORMIN ER (GLUCOPHAGE-XR) 500 MG 24 hr tablet, Take 1 tablet by mouth Daily With Breakfast., Disp: 30 tablet, Rfl: 10     Past Medical History:   Diagnosis Date   • Abnormal Pap smear of cervix    • Anxiety    • Chlamydia    • Depression    • Migraine    • Multiple gestation    • Ovarian cyst    • Polycystic ovary syndrome    • Trauma    • Varicella         Past Surgical History:   Procedure Laterality Date   • INNER EAR SURGERY     • WISDOM TOOTH EXTRACTION         The additional following portions of the patient's history were reviewed and updated as appropriate: allergies, current medications, past family history, past medical history, past social history, past surgical history and problem list.    Review of Systems   Constitutional: Negative.    Respiratory: Negative.    Cardiovascular: Negative.    Gastrointestinal: Negative.    Genitourinary: Positive for menstrual problem and pelvic pain. Negative for breast discharge, breast lump and breast pain.   Musculoskeletal: Negative.    Neurological: Negative.    Psychiatric/Behavioral: Negative.   "      I have reviewed and agree with the HPI, ROS, and historical information as entered above. Justyn Thomason MD    Objective   /72   Ht 162.6 cm (64.02\")   Wt 92.1 kg (203 lb)   BMI 34.82 kg/m²     Physical Exam  Vitals reviewed.   Constitutional:       Appearance: Normal appearance. She is normal weight.   Abdominal:      General: Abdomen is flat. Bowel sounds are normal.      Palpations: Abdomen is soft.   Skin:     General: Skin is warm and dry.   Neurological:      Mental Status: She is alert and oriented to person, place, and time.   Psychiatric:         Mood and Affect: Mood normal.         Behavior: Behavior normal.         Assessment & Plan     Assessment     Problem List Items Addressed This Visit        Genitourinary and Reproductive     Menorrhagia with irregular cycle - Primary    Relevant Orders    CBC (No Diff) (Completed)    Hemoglobin A1c (Completed)    TSH (Completed)    Comprehensive Metabolic Panel (Completed)    Follicle Stimulating Hormone (Completed)       1. Menorrhagia    Plan     Return in about 7 weeks (around 5/8/2023) for for preoperative visit.  1. Will draw labs today to rule out anemia due to heavy menstrual bleeding.   2. Continue megace and will proceed with hysteroscopy, D&C, endometrial polypectomy with Myosure. Risk, benefits and alternative treatment options discussed and all questions answered. Risk of bleeding, infection, damage to internal organs, and anesthesia risks explained.       Justyn Thomason MD  03/20/2023  "

## 2023-03-21 LAB
ALBUMIN SERPL-MCNC: 4.6 G/DL (ref 3.8–4.8)
ALBUMIN/GLOB SERPL: 1.9 {RATIO} (ref 1.2–2.2)
ALP SERPL-CCNC: 77 IU/L (ref 44–121)
ALT SERPL-CCNC: 16 IU/L (ref 0–32)
AST SERPL-CCNC: 11 IU/L (ref 0–40)
BILIRUB SERPL-MCNC: 0.3 MG/DL (ref 0–1.2)
BUN SERPL-MCNC: 12 MG/DL (ref 6–20)
BUN/CREAT SERPL: 12 (ref 9–23)
CALCIUM SERPL-MCNC: 9.9 MG/DL (ref 8.7–10.2)
CHLORIDE SERPL-SCNC: 106 MMOL/L (ref 96–106)
CO2 SERPL-SCNC: 19 MMOL/L (ref 20–29)
CREAT SERPL-MCNC: 1.04 MG/DL (ref 0.57–1)
EGFRCR SERPLBLD CKD-EPI 2021: 73 ML/MIN/1.73
ERYTHROCYTE [DISTWIDTH] IN BLOOD BY AUTOMATED COUNT: 12.5 % (ref 11.7–15.4)
FSH SERPL-ACNC: 5 MIU/ML
GLOBULIN SER CALC-MCNC: 2.4 G/DL (ref 1.5–4.5)
GLUCOSE SERPL-MCNC: 72 MG/DL (ref 70–99)
HBA1C MFR BLD: 5.6 % (ref 4.8–5.6)
HCT VFR BLD AUTO: 42.4 % (ref 34–46.6)
HGB BLD-MCNC: 14.7 G/DL (ref 11.1–15.9)
MCH RBC QN AUTO: 28.6 PG (ref 26.6–33)
MCHC RBC AUTO-ENTMCNC: 34.7 G/DL (ref 31.5–35.7)
MCV RBC AUTO: 83 FL (ref 79–97)
PLATELET # BLD AUTO: 395 X10E3/UL (ref 150–450)
POTASSIUM SERPL-SCNC: 4.2 MMOL/L (ref 3.5–5.2)
PROT SERPL-MCNC: 7 G/DL (ref 6–8.5)
RBC # BLD AUTO: 5.14 X10E6/UL (ref 3.77–5.28)
SODIUM SERPL-SCNC: 143 MMOL/L (ref 134–144)
TSH SERPL DL<=0.005 MIU/L-ACNC: 2.06 UIU/ML (ref 0.45–4.5)
WBC # BLD AUTO: 11.5 X10E3/UL (ref 3.4–10.8)

## 2023-05-08 ENCOUNTER — OFFICE VISIT (OUTPATIENT)
Dept: OBSTETRICS AND GYNECOLOGY | Facility: CLINIC | Age: 33
End: 2023-05-08
Payer: COMMERCIAL

## 2023-05-08 VITALS
SYSTOLIC BLOOD PRESSURE: 124 MMHG | RESPIRATION RATE: 18 BRPM | WEIGHT: 207 LBS | HEIGHT: 64 IN | BODY MASS INDEX: 35.34 KG/M2 | DIASTOLIC BLOOD PRESSURE: 72 MMHG

## 2023-05-08 DIAGNOSIS — N92.1 MENORRHAGIA WITH IRREGULAR CYCLE: ICD-10-CM

## 2023-05-08 DIAGNOSIS — N84.0 ENDOMETRIAL POLYP: Primary | ICD-10-CM

## 2023-05-08 NOTE — PROGRESS NOTES
Gynecologic Preoperative Exam Note      Cc: Menorrhagia    Subjective   Kristen Nicolas is a 33 y.o. year old  who is scheduled for hysteroscopy/D&C at [facility] on 2023 at 7:30am.   Her pre operative diagnosis is Menorrhagia. She does not need to see her PCP for preop clearance for this surgery. No LMP recorded (lmp unknown).. Her birth control method is megace.  Her BMI is Body mass index is 35.51 kg/m²..      She has reviewed the information concerning hysteroscopy, dilation and curettage.     She understands the risks of bleeding, infection, possible damage to other organ systems, including but not limited to the gastrointestinal tract and genitourinary tract.  She also understands the specific risks listed in the preop information (video, pamphlets, etc.).    She has reviewed and signed the preop consent form.    She has been instructed to have a light dinner the night before surgery, then nothing to eat or drink after midnight.  The day of surgery do not chew gum or smoke.  Remove all jewelry, nail polish, contact lenses prior to coming to the hospital.  Do not bring valuables or large sums of money with you. Patient was instructed on what time to arrive and where to check in, maps were given.  She was instructed that she will meet an Anesthesiologist and that an IV will be started to provide fluids and sedation.  The total time of procedure was discussed.  She was instructed that she will need a .      No Known Allergies  She has confirmed that she is not allergic to Latex.     She is on the following medications. These were reviewed with the patient today and instructed on which medications are ok to take with a sip of water prior to the surgery.      Current Outpatient Medications:   •  albuterol sulfate  (90 Base) MCG/ACT inhaler, Inhale 2 puffs Every 4 (Four) Hours As Needed., Disp: , Rfl:   •  buPROPion XL (WELLBUTRIN XL) 300 MG 24 hr tablet, Take 1 tablet by mouth Every  Morning., Disp: , Rfl:   •  busPIRone (BUSPAR) 10 MG tablet, Take 1 tablet by mouth 3 (Three) Times a Day As Needed., Disp: , Rfl:   •  dicyclomine (BENTYL) 10 MG capsule, Take 2 capsules by mouth 3 (Three) Times a Day As Needed (cramping)., Disp: 30 capsule, Rfl: 3  •  doxepin (SINEquan) 10 MG capsule, Take 1 capsule by mouth Every Night., Disp: , Rfl:   •  ibuprofen (ADVIL,MOTRIN) 800 MG tablet, Take 1 tablet by mouth Every 8 (Eight) Hours As Needed for Mild Pain., Disp: 30 tablet, Rfl: 1  •  megestrol (MEGACE) 40 MG tablet, Take 1 tablet by mouth Daily for 60 days., Disp: 60 tablet, Rfl: 5  •  metFORMIN ER (GLUCOPHAGE-XR) 500 MG 24 hr tablet, Take 1 tablet by mouth Daily With Breakfast., Disp: 30 tablet, Rfl: 10  •  Nurtec 75 MG dispersible tablet, TAKE 1 TABLET(S) ORAL AS NEEDED FOR HEADACHE, Disp: , Rfl:   •  omeprazole (priLOSEC) 20 MG capsule, Take 1 capsule by mouth Daily., Disp: , Rfl:   •  promethazine (PHENERGAN) 12.5 MG tablet, Take 1 tablet by mouth Every 6 (Six) Hours As Needed for Nausea., Disp: 20 tablet, Rfl: 1  •  spironolactone (ALDACTONE) 25 MG tablet, Take 1 tablet by mouth 2 (Two) Times a Day., Disp: , Rfl:   •  vilazodone (VIIBRYD) 20 MG tablet tablet, Take 2 tablets by mouth Daily., Disp: , Rfl:      Past Medical History:   Diagnosis Date   • Abnormal Pap smear of cervix    • Anxiety    • Chlamydia    • Depression    • Migraine    • Multiple gestation    • Ovarian cyst    • Polycystic ovary syndrome    • Trauma    • Varicella      Past Surgical History:   Procedure Laterality Date   • INNER EAR SURGERY     • WISDOM TOOTH EXTRACTION       OB History    Para Term  AB Living   2 0 0 0 0 0   SAB IAB Ectopic Molar Multiple Live Births   0 0 0 0 0 0      # Outcome Date GA Lbr Moisés/2nd Weight Sex Delivery Anes PTL Lv   2             1               Social History     Tobacco Use   Smoking Status Former   • Packs/day: 0.00   • Years: 15.00   • Pack years: 0.00   • Types:  Cigarettes   • Start date: 2017   • Quit date: 2022   • Years since quittin.8   Smokeless Tobacco Never     Social History     Substance and Sexual Activity   Alcohol Use Yes   • Alcohol/week: 1.0 standard drink   • Types: 1 Cans of beer per week    Comment: social     Social History     Substance and Sexual Activity   Drug Use Never         Review of Systems   Constitutional: Negative.    Respiratory: Negative.    Cardiovascular: Negative.    Gastrointestinal: Negative.    Genitourinary: Positive for menstrual problem and pelvic pain.   Musculoskeletal: Negative.    Neurological: Negative.    Psychiatric/Behavioral: Negative.       All other systems reviewed and negative.          Objective    Vitals:    23 1513   BP: 124/72   Resp: 18         Physical Exam  Vitals reviewed.   Constitutional:       Appearance: Normal appearance. She is normal weight.   HENT:      Head: Normocephalic and atraumatic.   Cardiovascular:      Rate and Rhythm: Normal rate and regular rhythm.   Pulmonary:      Effort: Pulmonary effort is normal.      Breath sounds: Normal breath sounds.   Abdominal:      General: Abdomen is flat. Bowel sounds are normal.      Palpations: Abdomen is soft.   Skin:     General: Skin is warm and dry.   Neurological:      Mental Status: She is alert and oriented to person, place, and time.   Psychiatric:         Mood and Affect: Mood normal.         Behavior: Behavior normal.         Assessment   Problem List Items Addressed This Visit        Genitourinary and Reproductive     Menorrhagia with irregular cycle   Other Visit Diagnoses     Endometrial polyp    -  Primary    Relevant Orders    CBC (No Diff)    Comprehensive Metabolic Panel    HCG, B-subunit, Quantitative                   Plan   1. Will proceed with hysteroscopy, dilation and curettage, endometrial polypectomy with Myosure.   2. Risks of surgery were reviewed with the patient including risks of bleeding, infection, damage to  other organ systems including, but not limited to GI and  tracts (bowel, bladder, blood vessels, nerves) risks of Anesthesia, as well as the risk the surgery will not produce the desired results, possible need for additional surgery, death, risk of uterine perforation.  3. PAT Scheduled    4. Peewee has been obtained and reviewed   5. Pain Medication Consent Form has been signed.  A review regarding proper medication administration, impact on driving and working while medicated, the safety of use in pregnancy, the potential for overdose and the proper disposal and storage of controlled medications has been done with the patient.          Justyn Thomason MD  5/8/2023

## 2023-05-09 LAB
ALBUMIN SERPL-MCNC: 4.6 G/DL (ref 3.8–4.8)
ALBUMIN/GLOB SERPL: 2 {RATIO} (ref 1.2–2.2)
ALP SERPL-CCNC: 73 IU/L (ref 44–121)
ALT SERPL-CCNC: 17 IU/L (ref 0–32)
AST SERPL-CCNC: 14 IU/L (ref 0–40)
BILIRUB SERPL-MCNC: 0.3 MG/DL (ref 0–1.2)
BUN SERPL-MCNC: 10 MG/DL (ref 6–20)
BUN/CREAT SERPL: 10 (ref 9–23)
CALCIUM SERPL-MCNC: 9.6 MG/DL (ref 8.7–10.2)
CHLORIDE SERPL-SCNC: 106 MMOL/L (ref 96–106)
CO2 SERPL-SCNC: 17 MMOL/L (ref 20–29)
CREAT SERPL-MCNC: 0.99 MG/DL (ref 0.57–1)
EGFRCR SERPLBLD CKD-EPI 2021: 77 ML/MIN/1.73
ERYTHROCYTE [DISTWIDTH] IN BLOOD BY AUTOMATED COUNT: 12.7 % (ref 11.7–15.4)
GLOBULIN SER CALC-MCNC: 2.3 G/DL (ref 1.5–4.5)
GLUCOSE SERPL-MCNC: 129 MG/DL (ref 70–99)
HCG INTACT+B SERPL-ACNC: <1 MIU/ML
HCT VFR BLD AUTO: 41.1 % (ref 34–46.6)
HGB BLD-MCNC: 13.9 G/DL (ref 11.1–15.9)
MCH RBC QN AUTO: 28.6 PG (ref 26.6–33)
MCHC RBC AUTO-ENTMCNC: 33.8 G/DL (ref 31.5–35.7)
MCV RBC AUTO: 85 FL (ref 79–97)
PLATELET # BLD AUTO: 371 X10E3/UL (ref 150–450)
POTASSIUM SERPL-SCNC: 4.1 MMOL/L (ref 3.5–5.2)
PROT SERPL-MCNC: 6.9 G/DL (ref 6–8.5)
RBC # BLD AUTO: 4.86 X10E6/UL (ref 3.77–5.28)
SODIUM SERPL-SCNC: 140 MMOL/L (ref 134–144)
WBC # BLD AUTO: 10.8 X10E3/UL (ref 3.4–10.8)

## 2023-05-11 ENCOUNTER — OUTSIDE FACILITY SERVICE (OUTPATIENT)
Dept: OBSTETRICS AND GYNECOLOGY | Facility: CLINIC | Age: 33
End: 2023-05-11
Payer: COMMERCIAL

## 2023-05-11 ENCOUNTER — LAB REQUISITION (OUTPATIENT)
Dept: LAB | Facility: HOSPITAL | Age: 33
End: 2023-05-11
Payer: COMMERCIAL

## 2023-05-11 DIAGNOSIS — N92.1 EXCESSIVE AND FREQUENT MENSTRUATION WITH IRREGULAR CYCLE: ICD-10-CM

## 2023-05-11 DIAGNOSIS — Z98.890 STATUS POST HYSTEROSCOPY: Primary | ICD-10-CM

## 2023-05-11 PROCEDURE — 88305 TISSUE EXAM BY PATHOLOGIST: CPT

## 2023-05-11 RX ORDER — IBUPROFEN 800 MG/1
800 TABLET ORAL EVERY 8 HOURS PRN
Qty: 30 TABLET | Refills: 1 | Status: SHIPPED | OUTPATIENT
Start: 2023-05-11

## 2023-05-11 RX ORDER — PROMETHAZINE HYDROCHLORIDE 12.5 MG/1
12.5 TABLET ORAL EVERY 6 HOURS PRN
Qty: 20 TABLET | Refills: 1 | Status: SHIPPED | OUTPATIENT
Start: 2023-05-11

## 2023-05-11 RX ORDER — OXYCODONE HYDROCHLORIDE AND ACETAMINOPHEN 5; 325 MG/1; MG/1
1 TABLET ORAL EVERY 8 HOURS PRN
Qty: 8 TABLET | Refills: 0 | Status: SHIPPED | OUTPATIENT
Start: 2023-05-11

## 2023-05-15 LAB — REF LAB TEST METHOD: NORMAL

## 2023-05-30 ENCOUNTER — OFFICE VISIT (OUTPATIENT)
Dept: OBSTETRICS AND GYNECOLOGY | Facility: CLINIC | Age: 33
End: 2023-05-30

## 2023-05-30 VITALS
RESPIRATION RATE: 18 BRPM | SYSTOLIC BLOOD PRESSURE: 106 MMHG | HEIGHT: 64 IN | DIASTOLIC BLOOD PRESSURE: 74 MMHG | BODY MASS INDEX: 35.34 KG/M2 | WEIGHT: 207 LBS

## 2023-05-30 DIAGNOSIS — E28.2 PCOS (POLYCYSTIC OVARIAN SYNDROME): Primary | ICD-10-CM

## 2023-05-30 DIAGNOSIS — Z09 POSTOPERATIVE EXAMINATION: ICD-10-CM

## 2023-05-30 RX ORDER — PROGESTERONE 200 MG/1
200 CAPSULE ORAL DAILY
Qty: 10 CAPSULE | Refills: 5 | Status: SHIPPED | OUTPATIENT
Start: 2023-05-30 | End: 2023-06-09

## 2023-05-30 NOTE — PROGRESS NOTES
OBGYN Postoperative Exam Note          Subjective   Chief Complaint   Patient presents with   • Post-op     Kristen Alisia Nicolas is a 33 y.o. year old  presenting to be seen for her post-operative visit. She is S/P hysteroscopy on 2023 at [facility] for Menorrhagia. Currently she reports no problems with eating, bowel movements, voiding, or wound drainage and pain is well controlled.    The pathology results from her procedure are in Kristen's record and are benign.  Discussed proceeding with conception vs regulation of periods.  Discussed PCOS along with anovulatory cycles and recommend clomid. Risks associated discussed and all questions answered.     OTHER THINGS SHE WANTS TO DISCUSS TODAY:  Nothing else      Current Outpatient Medications:   •  albuterol sulfate  (90 Base) MCG/ACT inhaler, Inhale 2 puffs Every 4 (Four) Hours As Needed., Disp: , Rfl:   •  buPROPion XL (WELLBUTRIN XL) 300 MG 24 hr tablet, Take 1 tablet by mouth Every Morning., Disp: , Rfl:   •  busPIRone (BUSPAR) 10 MG tablet, Take 1 tablet by mouth 3 (Three) Times a Day As Needed., Disp: , Rfl:   •  dicyclomine (BENTYL) 10 MG capsule, Take 2 capsules by mouth 3 (Three) Times a Day As Needed (cramping)., Disp: 30 capsule, Rfl: 3  •  doxepin (SINEquan) 10 MG capsule, Take 1 capsule by mouth Every Night., Disp: , Rfl:   •  ibuprofen (ADVIL,MOTRIN) 800 MG tablet, Take 1 tablet by mouth Every 8 (Eight) Hours As Needed for Mild Pain., Disp: 30 tablet, Rfl: 1  •  ibuprofen (ADVIL,MOTRIN) 800 MG tablet, Take 1 tablet by mouth Every 8 (Eight) Hours As Needed for Mild Pain., Disp: 30 tablet, Rfl: 1  •  metFORMIN ER (GLUCOPHAGE-XR) 500 MG 24 hr tablet, Take 1 tablet by mouth Daily With Breakfast., Disp: 30 tablet, Rfl: 10  •  Nurtec 75 MG dispersible tablet, TAKE 1 TABLET(S) ORAL AS NEEDED FOR HEADACHE, Disp: , Rfl:   •  omeprazole (priLOSEC) 20 MG capsule, Take 1 capsule by mouth Daily., Disp: , Rfl:   •  promethazine (PHENERGAN) 12.5  "MG tablet, Take 1 tablet by mouth Every 6 (Six) Hours As Needed for Nausea., Disp: 20 tablet, Rfl: 1  •  promethazine (PHENERGAN) 12.5 MG tablet, Take 1 tablet by mouth Every 6 (Six) Hours As Needed for Nausea., Disp: 20 tablet, Rfl: 1  •  spironolactone (ALDACTONE) 25 MG tablet, Take 1 tablet by mouth 2 (Two) Times a Day., Disp: , Rfl:   •  vilazodone (VIIBRYD) 20 MG tablet tablet, Take 2 tablets by mouth Daily., Disp: , Rfl:   •  clomiPHENE (CLOMID) 50 MG tablet, 1 tablet daily day 3 - 7, Disp: 5 tablet, Rfl: 5  •  Progesterone (Prometrium) 200 MG capsule, Take 1 capsule by mouth Daily for 10 days., Disp: 10 capsule, Rfl: 5     Past Medical History:   Diagnosis Date   • Abnormal Pap smear of cervix    • Anxiety    • Chlamydia    • Depression    • Migraine    • Multiple gestation    • Ovarian cyst    • Polycystic ovary syndrome    • Trauma    • Varicella         Past Surgical History:   Procedure Laterality Date   • INNER EAR SURGERY     • WISDOM TOOTH EXTRACTION         The following portions of the patient's history were reviewed and updated as appropriate:current medications and allergies    Review of Systems   Constitutional: Negative.    Respiratory: Negative.    Cardiovascular: Negative.    Gastrointestinal: Negative.    Genitourinary: Negative.    Musculoskeletal: Negative.    Neurological: Negative.    Psychiatric/Behavioral: Negative.           Objective   /74   Resp 18   Ht 162.6 cm (64.02\")   Wt 93.9 kg (207 lb)   LMP 07/24/2022 (Within Days)   BMI 35.51 kg/m²     Physical Exam  Vitals reviewed.   Constitutional:       Appearance: Normal appearance. She is normal weight.   HENT:      Head: Normocephalic and atraumatic.   Abdominal:      General: Abdomen is flat. Bowel sounds are normal.      Palpations: Abdomen is soft.   Skin:     General: Skin is warm and dry.   Neurological:      Mental Status: She is alert and oriented to person, place, and time.   Psychiatric:         Mood and Affect: " Mood normal.         Behavior: Behavior normal.              Assessment   1. S/P hysteroscopy with Myosure  2. PCOS     Plan   1. May return to full activity with no restrictions  2. The importance of keeping all planned follow-up and taking all medications as prescribed was emphasized.  3. Will stop provera and start clomid along with timed intercourse. Instructions given to the patient and will initiate periods with prometrium if needed.   4. Return in about 6 months (around 11/30/2023) for visit.              Justyn Thomason MD  05/30/2023    Answers for HPI/ROS submitted by the patient on 5/30/2023  Please describe your symptoms.: Post op  Have you had these symptoms before?: No  How long have you been having these symptoms?: 1-4 days  Please list any medications you are currently taking for this condition.: N/A here for post op  Please describe any probable cause for these symptoms. : N/A  What is the primary reason for your visit?: Other